# Patient Record
(demographics unavailable — no encounter records)

---

## 2024-11-26 NOTE — HISTORY OF PRESENT ILLNESS
[FreeTextEntry1] : History of present illness The 65-year-old male patient with T2DM, HTN, HLD, and preserved EF on echo in March 2024, presented for a follow-up visit. The patient was last seen three months ago when amlodipine was added to his medication regimen. He reports that his blood pressure has been much better since then, with readings of 127/82 in the morning and 133/87 after lunch. He denies any new symptoms such as chest pain or difficulty breathing. He remains active, walking three to four times a week for a couple of miles each time. His recent EKG showed a normal rhythm, and his cholesterol numbers have improved, likely due to compliance with the prescribed medication.  Past medical history - Type 2 Diabetes Mellitus - Hypertension - Hyperlipidemia  Review of Symptoms  Constitutional, Visual, Respiratory, Cardiovascular, Gastrointestinal, Genitourinary, Neurologic, Integumentary, Endocrine, Musculoskeletal, Psychiatric, and Hematologic systems reviewed and are all negative except as in HPI.  Family history No family history of premature CAD  Social history The patient is active, walking three to four times a week. No smoking or alcohol use.  Current medications - Amlodipine - Atorvastatin 40mg once a day at bedtime - Telmisartan 40mg once a day - Metformin  Vitals Blood pressure: 127/82 in the morning, 133/87 after lunch  Physical exam Heart sounds are clear on auscultation. Unless superseded by above, rest of exam was normal including General: No acute distress HEENT: EOMI Neck: Supple, No JVD, no bruits Lungs: Clear to auscultation bilaterally; No rales or wheezing Heart: Regular rate and rhythm; No murmurs, rubs, or gallops Abdomen: Nontender, bowel sounds present Extremities: No clubbing, cyanosis, or edema Nervous system: Alert & Oriented X3, no focal deficits Psychiatric: Normal affect Skin: No rashes or lesions.  Lab results Improved cholesterol numbers  Assessment The 65-year-old male patient with a history of T2DM, HTN, HLD, and preserved EF on echo in March 2024, presents for a follow-up visit. The patient's clinical status appears to be improving, with improved blood pressure regulation and an improved lipid profile. He is tolerating his medications well and reports no side effects. His physical activity level is adequate, and he has no new symptoms. His Electrocardiogram (EKG) demonstrates normal sinus rhythm, and his heart sounds are clear on auscultation.  Plan 1. Hypertension: Continue telmisartan 40mg once a day and amlodipine 5mg once a day. Well controlled 2. Hyperlipidemia: Continue atorvastatin 40mg once a day at bedtime. Well controlled 3. Diabetes Mellitus: Continue current management with metformin. 4. Lifestyle: Engage in regular physical activity and adhere to a balanced nutritional regimen. 5. Follow-up: Schedule a follow-up appointment in six months, or earlier if any symptoms or concerns develop.  Prescription - Continue atorvastatin 40mg once a day at bedtime - Continue telmisartan 40mg once a day - Continue amlodipine 5mg once a day  Appointments Next follow-up appointment in six months (May 2025)  This note was generated using [employer] software. A reasonable effort has been made for proofreading its contents, but typos may still remain. If there are any questions or points of clarification needed, please notify the office.

## 2024-12-17 NOTE — PLAN
[FreeTextEntry1] : pt has seen diabetic educator and has not seen endocrinologist I refreshed referral.

## 2024-12-17 NOTE — HEALTH RISK ASSESSMENT
[Very Good] : ~his/her~  mood as very good [Yes] : Yes [Monthly or less (1 pt)] : Monthly or less (1 point) [1 or 2 (0 pts)] : 1 or 2 (0 points) [Never (0 pts)] : Never (0 points) [No] : In the past 12 months have you used drugs other than those required for medical reasons? No [No falls in past year] : Patient reported no falls in the past year [Little interest or pleasure doing things] : 1) Little interest or pleasure doing things [Feeling down, depressed, or hopeless] : 2) Feeling down, depressed, or hopeless [0] : 2) Feeling down, depressed, or hopeless: Not at all (0) [PHQ-2 Negative - No further assessment needed] : PHQ-2 Negative - No further assessment needed [Never] : Never [NO] : No [Patient reported colonoscopy was abnormal] : Patient reported colonoscopy was abnormal [HIV Test offered] : HIV Test offered [Hepatitis C test offered] : Hepatitis C test offered [None] : None [With Family] : lives with family [# of Members in Household ___] :  household currently consist of [unfilled] member(s) [Retired] : retired [Less Than High School] : less than high school [] :  [# Of Children ___] : has [unfilled] children [Sexually Active] : sexually active [Feels Safe at Home] : Feels safe at home [Fully functional (bathing, dressing, toileting, transferring, walking, feeding)] : Fully functional (bathing, dressing, toileting, transferring, walking, feeding) [Fully functional (using the telephone, shopping, preparing meals, housekeeping, doing laundry, using] : Fully functional and needs no help or supervision to perform IADLs (using the telephone, shopping, preparing meals, housekeeping, doing laundry, using transportation, managing medications and managing finances) [Smoke Detector] : smoke detector [Safety elements used in home] : safety elements used in home [Seat Belt] :  uses seat belt [Name: ___] : Health Care Proxy's Name: [unfilled]  [Relationship: ___] : Relationship: [unfilled] [Aggressive treatment] : aggressive treatment [FreeTextEntry1] : none [Audit-CScore] : 1 [de-identified] : exercises  three times a week walking bike riding  [de-identified] : healthy  [[id number]] : 0 [Change in mental status noted] : No change in mental status noted [Language] : denies difficulty with language [Behavior] : denies difficulty with behavior [Learning/Retaining New Information] : denies difficulty learning/retaining new information [Handling Complex Tasks] : denies difficulty handling complex tasks [Reasoning] : denies difficulty with reasoning [Spatial Ability and Orientation] : denies difficulty with spatial ability and orientation [Reports changes in hearing] : Reports no changes in hearing [Reports changes in vision] : Reports no changes in vision [Reports changes in dental health] : Reports no changes in dental health [Sunscreen] : does not use sunscreen [Travel to Developing Areas] : does not  travel to developing areas [TB Exposure] : is not being exposed to tuberculosis [Caregiver Concerns] : does not have caregiver concerns [ColonoscopyDate] : 3/18/24 [ColonoscopyComments] : polyps [FreeTextEntry3] :  [de-identified] : last eye exam  2024  [de-identified] : last dental 11/24 [AdvancecareDate] : 12/17/24

## 2024-12-17 NOTE — COUNSELING
[Fall prevention counseling provided] : Fall prevention counseling provided [Adequate lighting] : Adequate lighting [Use proper foot wear] : Use proper foot wear [Sleep ___ hours/day] : Sleep [unfilled] hours/day [Engage in a relaxing activity] : Engage in a relaxing activity [Plan in advance] : Plan in advance [Potential consequences of obesity discussed] : Potential consequences of obesity discussed [Benefits of weight loss discussed] : Benefits of weight loss discussed [Structured Weight Management Program suggested:] : Structured weight management program suggested [Encouraged to maintain food diary] : Encouraged to maintain food diary [Encouraged to increase physical activity] : Encouraged to increase physical activity [Encouraged to use exercise tracking device] : Encouraged to use exercise tracking device [Target Wt Loss Goal ___] : Weight Loss Goals: Target weight loss goal [unfilled] lbs [Weigh Self Weekly] : weigh self weekly [Decrease Portions] : decrease portions [____ min/wk Activity] : [unfilled] min/wk activity [Keep Food Diary] : keep food diary [None] : None [FreeTextEntry1] : low fat low salt  [FreeTextEntry2] : bmi 26 weight 179

## 2024-12-17 NOTE — ASSESSMENT
[FreeTextEntry1] : health He doesnt want vaccines and understands risks   he is up to date with colonoscopy ,eye dental  2 bmi 26  Weight loss, exercise, and diet control were discussed and are highly encouraged. Treatment options were given such as, aqua therapy, and contacting a nutritionist. Recommended to use the elliptical, stationary bike, less use of treadmill. Mindful eating was explained to the patient Obesity is associated with worsening asthma, shortness of breath, and potential for cardiac disease, diabetes, and other underlying medical conditions. 3 diabetes  ---The following has been discussed:--- -Targets for weight and HgA1c have been discussed with patient  -FS goals have been reviewed with the patient in detail: AM <130 post meal<160-180 -Diet and weight goals have been discussed with the patient in detail. -The importance of exercise in the treatment of diabetes has been discussed  with the patient in detail. -Extensive dietary advice provided to patient and the need to avoid concentrated  sweets/simple carbohydrates and to ensure to consume protein with each meal.  -Patient instructed to limit carbohydrates to 60 gms per meal and 15 gms per  snacks.  -Patient to keep a blood sugar log to check fasting and before meals -Patient instructed on importance of daily feet inspection and to reports any  open lesions to physician promptly  DIABETES EDUCATION: Extensive education about diabetes, hyperglycemia, hypoglycemia, glucose target ranges, dietary adherence to ADA diet, avoiding rice and sweets, eating fruits and fresh vegetables highly recommended. Advise to increase the physical activity. Reviewed the importance of following up with outpatient endocrinology/ opthalmology and podiatry appointments discussed. Explained the definition of HBA1C and target range. Explained the complications of diabetes including stroke, renal failure and blindness Reviewed hypoglycemic sign/symptoms and necessary precautions. Advised to buy a glucose tab available OTC and check the Blood sugars when feeling lightheadedness, take half cup of juice or a glucose tab and recheck in 15 min to see if BS improves. Patient verbalized understanding and agrees with the plan 4 hypertension pt sondra in chart and is well controlled    continue medicaitons  low salt diet.   DIETARY SALT (SODIUM); DASH DIET AND BLOOD PRESSURE: To decrease the sodium in your diet:   Use fresh vegetables and foods as much as possible.  Avoid canned and processed foods. Cured meats such as fisher, ham, and sausages are high in salt.  Try using different herbs and spices in your cooking instead of salt.  In restaurants, avoid foods with sauces, cheese, and cured meats. Ask for low-sodium choices. To get more potassium in your diet, eat:  Bananas, fresh or dried apricots, peaches, citrus fruits, melons  Cauliflower, broccoli, tomatoes, carrots, raw spinach, beet greens, potatoes To get more magnesium in your diet, eat:  Whole grain foods, leafy green vegetables, dried fruits  Fish and seafood, poultry  To get more calcium in your diet, eat:  Nonfat milk, yogurt, and low-fat cheeses   Kress and sardines  Cooked dried beans  Broccoli, kale, and bok belem  Tofu or soybean curd DASH stands for "dietary approaches to stop hypertension." The DASH diet is low in total and saturated fat. It is rich in fruits, vegetables, and low-fat dairy foods. The diet allows you to get natural fiber, calcium, and magnesium from food. It prevents or lowers high blood pressure. It can also help lower cholesterol in your blood.  Don't change how you eat all at once. It's much more likely that you'll succeed if you make only one or two small changes at a time. Wait until those changes are a habit, then make a couple more changes. Some good starting steps include:  Add one serving of vegetables to your meals at lunch and dinner. This is an easy way to help you get more vegetables in your diet.  Have a piece of fruit as an afternoon or after-dinner snack. One glass of juice at breakfast is not enough fruit in your diet.  Use half your usual amount of butter, margarine, or salad dressing.  Buy nonfat salad dressing or nonfat sour cream. Follow this guide to select your menu of meals. The number of calories we want you to eat each day will tell you how many servings you can choose from each food group. Calories: 1600 2100 2600 3100 Servings Grains 6 7  10  12  Vegetables 	 4 4  5 6 Fruit 4 5 5 6 Dairy (low-fat) 2  3 3 3  Meat, poultry, fish  1  2 2  Nuts, seeds    1 Fats and sweets 1  2  3 4 Grains and grain products like breads and cereals provide energy, fiber and vitamins. Whole grains have more of these nutrients. One serving equals one of the following: Bagel, 1/2 medium; Barley, cooked 1/2 cup; Biscuit, country style 1 medium; Bread, whole wheat, white 1 slice; Cereals, cold or cooked, 1/2 cup; Cornbread, 1 medium piece; Crackers, sami, 2; Crackers, saltine, 4; Dinner roll, 1medium; English muffin, ; Hamburger bun, ; Muffin, 1 medium; Pancake, 1 medium; Pasta, 1/2 cup cooked; [place], 1/2 large or 1 small; Popcorn, 1 cup popped; Pretzels, 1 ounce; Rice, white, brown, or wild, 1/2 cup cooked; Tortillas, corn or flour, 1 medium; Waffle, 1 medium; Wheat germ, 1/4 cup;  Vegetables are rich sources of potassium, magnesium, and fiber. One serving is 1/2 cup of any of the following cooked vegetables: Asparagus, Beans (green, yellow), Beets, Broccoli, [place] Sprouts, Carrots, Cauliflower, Bonnie, chicory, mustard and turnip (and other) greens, Corn, Kale, Lima beans, Mixed vegetables, Okra, Parsnips, Peas, green, Potatoes (1/2 medium or 1/2 cup mashed), Pumpkin, Rutabaga, Spaghetti or tomato sauce, Spinach, Squash (zucchini or yellow), Stewed tomatoes, Succotash, Sweet potatoes, Turnips, Yam  Raw vegetables: Carrots,1/2 cup; Celery, 1/2 cup; Lettuce (shilo, loose-leaf, green-leaf), 1 cup; Peppers, 1/2 cup; Spinach, 1 cup; Tomato, 1/2 Fruits and fruit juices are important sources of potassium and magnesium. Fruits also contain fiber and are low in sodium and fat. One serving equals: Any fruit juice, # cup (6 ounces); Canned or frozen fruit,  cup (includes applesauce); Dried fruit,  cup;  Fresh fruit: Apple, 1 medium; Apricots, 2 medium; Banana, 1 medium; Berries, 1/2 cup; Melon, 1 wedge, or 1/2 cup; Cherries, 10; Grapefruit, 1/2; Grapes, 15; Kiwi, 1 medium; Ryan, 1/2 small; Nectarine, 1 medium; Orange, 1 medium; Peach, 1 medium; Pear, 1 medium; Pineapple, 1/2 cup; Plums, 2 medium; Tangerine, 1 large Dairy foods provide protein and calcium. Use low-fat or nonfat dairy products to cut down on fat. One serving equals: Skim milk, 1 cup (8 ounces); 1% low fat milk, 1 cup (8 ounces); 2% low fat milk, 1 cup (8 ounces) nonfat dry milk powder (1/3 cup); Low-fat cottage cheese, 1 cup (8 ounces); Parmesan cheese, 1 tablespoon; Mozzarella cheese, part skim, 1/4 cup (1 ounce); Low-fat cheddar cheese, 11/2 ounces; Ricotta cheese, part skim milk or nonfat, 1/4 cup (11/2 ounces); Other low fat or nonfat cheeses (11/2 oz.); Low-fat or nonfat yogurt, fruit-flavored or plain, 1 cup (8 ounces) Low-fat or nonfat frozen yogurt, 1/2 cup (4 ounces); Note: People who can't digest dairy products can try taking lactase enzyme pills or drops (available at drug and grocery stores) when they eat dairy. There is also milk available with the enzyme already added. Or you can buy lactose-free milk. Meat, poultry, and fish are good sources of protein and magnesium. One serving equals: Lean meat including beef, veal, or pork, 3 ounces cooked; Skinless, white meat poultry including turkey, chicken, 3 ounces; Fish and shellfish, 3 ounces cooked; Low-fat luncheon meats, 1 ounce; Egg, 1 medium; Tofu, 3 ounces Note: Three ounces of cooked meat is about the size of a deck of cards. Nuts, seeds, and legumes are rich sources of energy, magnesium, potassium, protein and fiber. Nuts and seeds are also high in fat, so portions should be small. Almonds, 1/3 cup; Beans such as kidney, erazo, and navy, 1/2 cup cooked; Chickpeas and lentils, 1/2 cup cooked; Cashews, 1/3 cup; Filberts, 1/3 cup; Mixed nuts, 1/3 cup; Peanut butter, 2 tablespoons; Peanuts, 1/3 cup; Sesame seeds, 2 tablespoons; Sunflower seeds, 2 tablespoons; Tofu, regular, 3 ounces; Walnuts, 1/3 cup  Following the above diet will give you about 27% fat in your diet. The goal is to have 30% or less of the calories you eat each day be from fat. To meet that goal, do not eat more than 2-3 servings daily of added fat. Also try to limit sweets. One serving equals: Butter or margarine, 1 teaspoon; Mayonnaise, 1 teaspoon; Low-fat mayonnaise, 1 tablespoon; Salad dressing, 1 tablespoon; Low-fat salad dressing, 2 tablespoons; Oil, 1 teaspoon (use olive, canola, safflower, or other vegetable oils); Candy, hard, 3 pieces; Jelly or jam, 1 tablespoon); Jell-O, 1/2 cup; Jelly beans, 1/2 ounce; Maple syrup, 1 tablespoon; Popsicle, 1; Sherbet or nonfat or low-fat frozen yogurt, 1/2 cup; Sugar, 1 tablespoon; Sugared lemonade or fruit punch, 1 cup (8 ounces); Note: Try diet fruit-flavored gelatin or frozen, canned, or fresh fruit for dessert.  Small amounts of alcohol may have benefits to the heart and blood pressure. However, excess use of alcohol can cause damage to the brain, liver and other organs. It can lead to high blood pressure. Drinking more than two drinks (15 ml) every day can raise your blood pressure. 15 ml of alcohol equals:   one 12-ounce bottle of beer   a half glass (5 ounces) of wine   1 ounce (one shot) of 100 proof hard liquor  5 hld  to lower  LDL and non HDL  cholesterol levels     1 limit your intake of foods full of saturated fats  , trans fats, and dietary cholesterol .  Food with a lot of saturated fate include butter, fatty flesh like red meat, full fat and low fat dairy  products  , palm oil and coconut oil .   If you see partially hydrogenated fat in the ingredient  list of food label that food has trans fats.  Top sources of dietary chol include egg yolks , organ meats and shell fish.  one Type of fat omega 3 Fatty acids has been shown to protect against heart disease  . Good sources are cold water fish like salmon, mackerel , halibut ., trout  herring and sardines.     Limit  your intake of meat , poultry and fish to no more than 3.5  ounces per day     Eat a lot more fiber rich foods  like beans , oats, barley fruits and vegetables   .  Food naturally rich in soluble fiber  are good at lowering cholesterol .    Excellent choices  are  oats , oat bran , barley , peas , yams sweet potatoes and legumes  or beans .  Good fruit sources are berries passion fruit, oranges pears,, apricots , apples  and nectarines  .    choose protein rich plant foods   such as legumes or beans nuts and seeds over meat.     Lose as much weight as possible and exercise   Take plant sterol supplements   .A Daily intake  of 1-2 gms  of plant sterols  lowers ldl .    Best choice is supplements  such as Cholestoff  by natures made   because they dont have calories  sugar trans fats   an salt  of many foods enriched with plant sterols.    Take  Metamucil or psyllium   .   Studies have shown 9-10 gms as daily of psyllium   the equivalent of  about  3 teaspoons  of sugar free Metamucil   reduced LDL levels  . 6 elevated psa  pt is being followed by urologist [place]

## 2024-12-17 NOTE — PHYSICAL EXAM
[No Acute Distress] : no acute distress [Well-Appearing] : well-appearing [Conjunctiva] : the conjunctiva were normal in both eyes [PERRL] : pupils were equal in size, round, and reactive to light [EOM Intact] : extraocular movements were intact [Thyroid Normal, No Nodules] : the thyroid was normal and there were no nodules present [Normal Appearance] : was normal in appearance [Neck Supple] : was supple [Rate ___] : at [unfilled] breaths per minute [Normal Rhythm/Effort] : normal respiratory rhythm and effort [Clear Bilaterally] : the lungs were clear to auscultation bilaterally [Normal to Percussion] : the lungs were normal to percussion [Heart Rate ___] : [unfilled] bpm [Rhythm Regular] : regular [Normal Rate] : normal [Normal S1] : normal S1 [Normal S2] : normal S2 [No Murmur] : no murmurs heard [No Pitting Edema] : no pitting edema present [2+] : left 2+ [No Abnormalities] : the abdominal aorta was not enlarged and no bruit was heard [Examination Of The Breasts] : a normal appearance [No Discharge] : no discharge [Soft, Nontender] : the abdomen was soft and nontender [No Mass] : no masses were palpated [No HSM] : no hepatosplenomegaly noted [Urethral Meatus] : meatus normal [Penis Abnormality] : normal uncircumcised penis [Urinary Bladder Findings] : the bladder was normal on palpation [Scrotum] : the scrotum was normal [Testes Tenderness] : no tenderness of the testes [Testes Mass (___cm)] : there were no testicular masses [No Lymphangitis] : no lymphangitis observed [Normal Kyphosis] : normal kyphosis [No Visual Abnormalities] : no visible abnormalities [Normal Lordosis] : normal lordosis [No Scoliosis] : no scoliosis [No Tenderness to Palpation] : no spine tenderness on palpation [No Masses] : no masses [Full ROM] : full ROM [No Pain with ROM] : no pain with motion in any direction [Intact] : all reflexes within normal limits bilaterally [Normal Station and Gait] : the gait and station were normal [Normal Motor Tone] : the muscle tone was normal [Involuntary Movements] : no involuntary movements were seen [Normal Scalp] : inspection of the scalp showed no abnormalities [Examination Of The Hair] : texture and distribution of hair was normal [Complexion Dark] : dark complexion [Multiple Tattoos] : multiple tattoos observed [Normal] : the deep tendon reflexes were normal [Normal Mental Status] : the patient's orientation, memory, attention, language and fund of knowledge were normal [Appropriate] : appropriate [Comprehensive Foot Exam Normal] : Right and left foot were examined and both feet are normal. No ulcers in either foot. Toes are normal and with full ROM.  Normal tactile sensation with monofilament testing throughout both feet [Enlarged Diffusely] : was not enlarged [S3] : no S3 [S4] : no S4 [Rt] : no varicose veins of the right leg [Lt] : no varicose veins of the left leg [Right Carotid Bruit] : no bruit heard over the right carotid [Left Carotid Bruit] : no bruit heard over the left carotid [Right Femoral Bruit] : no bruit heard over the right femoral artery [Left Femoral Bruit] : no bruit heard over the left femoral artery [Bruit] : no bruit heard [Postauricular Lymph Nodes Enlarged Bilaterally] : nodes not enlarged [Preauricular Lymph Nodes Enlarged Bilaterally] : nodes not enlarged [Submandibular Lymph Nodes Enlarged Bilaterally] : nodes not enlarged [Suboccipital Lymph Nodes Enlarged Bilaterally] : nodes not enlarged [Submental Lymph Nodes Enlarged] : nodes not enlarged [Cervical Lymph Nodes Enlarged Posterior Bilaterally] : nodes not enlarged [Cervical Lymph Nodes Enlarged Anterior Bilaterally] : nodes not enlarged [Supraclavicular Lymph Nodes Enlarged Bilaterally] : nodes not enlarged [Axillary Lymph Nodes Enlarged Bilaterally] : nodes not enlarged [Epitrochlear Lymph Nodes Enlarged Bilaterally] : nodes not enlarged [Femoral Lymph Nodes Enlarged Bilaterally] : nodes not enlarged [Inguinal Lymph Nodes Enlarged Bilaterally] : nodes not enlarged [Abnormal Color] : normal color and pigmentation [Skin Lesions 1] : no skin lesions were observed [Skin Turgor Decreased] : normal skin turgor [Impaired judgment] : intact judgment [Impaired Insight] : intact insight [de-identified] : tongue normal partial upper and lower  [de-identified] : scar on lef tknee from replacement

## 2024-12-17 NOTE — HISTORY OF PRESENT ILLNESS
[FreeTextEntry1] : cpe  [de-identified] : Pt is a 66 yr old man with  HTN, DM, HLD, colon polyps He -denies any headaches, nausea, vomiting, fever, chills, sweats, chest pain, chest pressure, diarrhea, constipation, dysphagia, sour taste in the mouth, dizziness, leg swelling, leg pain, myalgias, arthralgias, itchy eyes, itchy ears, heartburn, or reflux.

## 2025-01-27 NOTE — ASSESSMENT
[FreeTextEntry1] : Pt is a 66 year old male with PSA elevation. bph and ed  doing well bph  voiding well check ua cx   ed cont sildnefail   psa  repeat psa bmp     Patient is being seen today for evaluation and management of a chronic and longitudinal ongoing condition and I am of the primary treating physician.  BMP and PSA drawn today

## 2025-01-27 NOTE — HISTORY OF PRESENT ILLNESS
[FreeTextEntry1] : 04/18/2024 s/p prostate biopsy doing well no voiding complaints no fever path benign  07/24/2024 cc: 3 month f/u visit  65 year old male presents with f/u visit. He reports he's currently using Sildenafil on empty stomach and is experiencing no side effects.  01/24/2025 cc f/u visit Pt is a 66 year old male presenting for f/u visit. Pt reports feeling fine and reports no new urological symptoms. Pt reports sildenafil is working and uses as needed. Pt reports his urination is fine.

## 2025-01-27 NOTE — END OF VISIT
[FreeTextEntry4] : This note was written by Maryanne Branch on 01/24/2025 actively solely Elbert Mejía M.D. I, Maryanne Branch, am scribing for and in the presence of Elbert Mejía M.D. in the following sections HISTORY OF PRESENT ILLNESS, PAST MEDICAL/FAMILY/SOCIAL HISTORY; REVIEW OF SYSTEMS; VITAL SIGNS; PHYSICAL EXAM; ASSESSMENT/PLAN. All medical record entries made by this scribe at , Elbert Mejía M.D. direction and personally dictated by me on 01/24/2025. I personally performed the services described in the documentation, reviewed the documentation recorded by the scribe in my presence, and it accurately and completely records my words and actions.

## 2025-05-05 NOTE — PLAN
[FreeTextEntry1] : recommendations.   he needs to have prevnar  20 vaccine  and to bring in his cahrting of blood sugars and bp readings.

## 2025-05-05 NOTE — HEALTH RISK ASSESSMENT
[Yes] : Yes [Monthly or less (1 pt)] : Monthly or less (1 point) [1 or 2 (0 pts)] : 1 or 2 (0 points) [Never (0 pts)] : Never (0 points) [No] : In the past 12 months have you used drugs other than those required for medical reasons? No [No falls in past year] : Patient reported no falls in the past year [Little interest or pleasure doing things] : 1) Little interest or pleasure doing things [Feeling down, depressed, or hopeless] : 2) Feeling down, depressed, or hopeless [0] : 2) Feeling down, depressed, or hopeless: Not at all (0) [PHQ-2 Negative - No further assessment needed] : PHQ-2 Negative - No further assessment needed [Never] : Never [Audit-CScore] : 1 [de-identified] : walking and bike riding  [de-identified] : reg diet  [[id number]] : 0 [AdvancecareDate] : 5/5/25

## 2025-05-05 NOTE — HISTORY OF PRESENT ILLNESS
[FreeTextEntry1] : fu  [de-identified] : Pt is a 66 yr old man with  HTN, DM, HLD, colon polyps He -denies any headaches, nausea, vomiting, fever, chills, sweats, chest pain, chest pressure, diarrhea, constipation, dysphagia, sour taste in the mouth, dizziness, leg swelling, leg pain, myalgias, arthralgias, itchy eyes, itchy ears, heartburn, or reflux. he is unsure of the name of his glucose monitor.  he is taking his medications  and states his glucose levels varies  and didnt bring in his chart.   he states he is checking his bp and charted and is  131/81 and always checks in am and always lower . He has seen ophthalmologist and has new glasses and dentist.  he has not seen a podiatrist.   he is following diabetic diet.

## 2025-05-05 NOTE — COUNSELING
[Fall prevention counseling provided] : Fall prevention counseling provided [Adequate lighting] : Adequate lighting [No throw rugs] : No throw rugs [Use proper foot wear] : Use proper foot wear [Sleep ___ hours/day] : Sleep [unfilled] hours/day [Engage in a relaxing activity] : Engage in a relaxing activity [Plan in advance] : Plan in advance [Potential consequences of obesity discussed] : Potential consequences of obesity discussed [Benefits of weight loss discussed] : Benefits of weight loss discussed [Structured Weight Management Program suggested:] : Structured weight management program suggested [Encouraged to maintain food diary] : Encouraged to maintain food diary [Encouraged to increase physical activity] : Encouraged to increase physical activity [Encouraged to use exercise tracking device] : Encouraged to use exercise tracking device [Target Wt Loss Goal ___] : Weight Loss Goals: Target weight loss goal [unfilled] lbs [Weigh Self Weekly] : weigh self weekly [Decrease Portions] : decrease portions [____ min/wk Activity] : [unfilled] min/wk activity [Keep Food Diary] : keep food diary [FreeTextEntry1] : diabetic  [FreeTextEntry2] : bmi 27  weight 183 [None] : None [Good understanding] : Patient has a good understanding of lifestyle changes and steps needed to achieve self management goal

## 2025-05-05 NOTE — ASSESSMENT
[FreeTextEntry1] : 1 dm  He is up to date with eye exam and dnetal  not podiatry  ---The following has been discussed:--- -Targets for weight and HgA1c have been discussed with patient  -FS goals have been reviewed with the patient in detail: AM <130 post meal<160-180 -Diet and weight goals have been discussed with the patient in detail. -The importance of exercise in the treatment of diabetes has been discussed  with the patient in detail. -Extensive dietary advice provided to patient and the need to avoid concentrated  sweets/simple carbohydrates and to ensure to consume protein with each meal.  -Patient instructed to limit carbohydrates to 60 gms per meal and 15 gms per  snacks.  -Patient to keep a blood sugar log to check fasting and before meals -Patient instructed on importance of daily feet inspection and to reports any  open lesions to physician promptly A diet that includes carbohydrates from fruits, vegetables, whole grains, legumes, and low-fat milk is encouraged. People with diabetes are advised to avoid sugar-sweetened beverages (including fruit juice).   The ideal amount of carbohydrate intake is uncertain. However, it's important for people with diabetes to monitor carbohydrate intake in order to manage their blood sugar levels and adjust insulin dosing as needed. (See 'Carbohydrate counting' above.)   ?In general, a variety of eating patterns (low fat, low carbohydrate, Mediterranean, vegetarian) are acceptable. Eating a healthy diet that contains a lot of the foods you like will make it easier to stick to your plan. However, you should talk to your health care provider before starting any diet that involves extreme restriction (such as a very low carb or "keto" diet). Depending on your situation, some diets may not be recommended.   ?The type of fat consumed appears to be more important than the amount of total fat. Saturated fats (eg, in meats, cheese, ice cream) can be replaced with monounsaturated and polyunsaturated fatty acids (eg, in fish, olive oil, nuts). Trans fatty acid consumption should be kept as low as possible. Trans fats are banned from processed foods in the [place]. Although very small amounts of trans fats are naturally present in meats, poultry and dairy products, the amount is too small for concern.   As diabetes increases your risk of heart disease and stroke, eating a diet low in saturated and trans fats and cholesterol can help to reduce your cholesterol levels and decrease these risks.   ?A dietitian can help you to determine how much protein your diet should include. In general, it's a good idea to get protein from lean meats, fish eggs, beans, soy, and nuts, and to limit the amount of red meat you eat.   ?Eating a diet that is high in fiber may help to keep your blood sugar levels under control.  ?A diet that is low in sodium and high in fruits, vegetables, and low-fat dairy products can help keep blood pressure under control.   ?Artificial sweeteners do not affect blood glucose levels and may be consumed in moderation. If you consume sugar-sweetened beverages regularly, a beverage containing artificial sweeteners (such as diet soda) can be a good short-term replacement strategy. However, the best approach is to avoid both sugar-sweetened and artificially sweetened beverages, and try to drink more water.   ?In the past, people with diabetes were told to avoid all foods with added sugar. This is no longer recommended, although it's important to limit sugar intake. If you take insulin, you should calculate each pre-meal dose based upon the total number of carbohydrates in the food, which includes the sugar content. (See 'Carbohydrate counting' above.)   ?Products that are "sugar-free" or "fat-free" do not necessarily have a reduced number of calories or carbohydrates. Read all nutrition labels carefully and compare with other similar products to determine which has the best balance of serving size and number of calories, carbohydrates, fat, and fiber.   Some sugar-free foods, such as sugar-free gelatin and sugar-free gum, do not have a significant number of calories or carbohydrates and are considered "free foods." Any food that has less than 20 calories and 5 grams of carbohydrate is considered a free food, meaning that it does not affect body weight or require an adjustment to your medication. 2 hld  to lower  LDL and non HDL  cholesterol levels     1 limit your intake of foods full of saturated fats  , trans fats, and dietary cholesterol .  Food with a lot of saturated fate include butter, fatty flesh like red meat, full fat and low fat dairy  products  , palm oil and coconut oil .   If you see partially hydrogenated fat in the ingredient  list of food label that food has trans fats.  Top sources of dietary chol include egg yolks , organ meats and shell fish.  one Type of fat omega 3 Fatty acids has been shown to protect against heart disease  . Good sources are cold water fish like salmon, mackerel , halibut ., trout  herring and sardines.     Limit  your intake of meat , poultry and fish to no more than 3.5  ounces per day     Eat a lot more fiber rich foods  like beans , oats, barley fruits and vegetables   .  Food naturally rich in soluble fiber  are good at lowering cholesterol .    Excellent choices  are  oats , oat bran , barley , peas , yams sweet potatoes and legumes  or beans .  Good fruit sources are berries passion fruit, oranges pears,, apricots , apples  and nectarines  .    choose protein rich plant foods   such as legumes or beans nuts and seeds over meat.     Lose as much weight as possible and exercise   Take plant sterol supplements   .A Daily intake  of 1-2 gms  of plant sterols  lowers ldl .    Best choice is supplements  such as Cholestoff  by natures made   because they dont have calories  sugar trans fats   an salt  of many foods enriched with plant sterols.    Take  Metamucil or psyllium   .   Studies have shown 9-10 gms as daily of psyllium   the equivalent of  about  3 teaspoons  of sugar free Metamucil   reduced LDL levels  . 3. bmi 27   Weight loss, exercise, and diet control were discussed and are highly encouraged. Treatment options were given such as, aqua therapy, and contacting a nutritionist. Recommended to use the elliptical, stationary bike, less use of treadmill. Mindful eating was explained to the patient Obesity is associated with worsening asthma, shortness of breath, and potential for cardiac disease, diabetes, and other underlying medical conditions.   -Nutrition and lifestyle concepts reviewed in detail. Recommending an unprocessed diet with >= 50% of nutrients from whole plant sources; most food early in the day; most calories before 4 pm, and no food after 8 pm; advised starting with small sustainable changes and building up over time. The long-term plan for this type of dietary change was reviewed. A number of resources to help in initiating these changes were provided. -A particular emphasis was placed on the need to remove processed foods from the diet. The concept of insulin resistance was introduced as important for understanding effective weight loss measures. Educational handouts explaining these concepts were provided. 4. psa elevation fu with urologist.   5f hpn   DIETARY SALT (SODIUM); DASH DIET AND BLOOD PRESSURE: To decrease the sodium in your diet:   Use fresh vegetables and foods as much as possible.  Avoid canned and processed foods. Cured meats such as fisher, ham, and sausages are high in salt.  Try using different herbs and spices in your cooking instead of salt.  In restaurants, avoid foods with sauces, cheese, and cured meats. Ask for low-sodium choices. To get more potassium in your diet, eat:  Bananas, fresh or dried apricots, peaches, citrus fruits, melons  Cauliflower, broccoli, tomatoes, carrots, raw spinach, beet greens, potatoes To get more magnesium in your diet, eat:  Whole grain foods, leafy green vegetables, dried fruits  Fish and seafood, poultry  To get more calcium in your diet, eat:  Nonfat milk, yogurt, and low-fat cheeses   [place] and sardines  Cooked dried beans  Broccoli, kale, and bok belem  Tofu or soybean curd DASH stands for "dietary approaches to stop hypertension." The DASH diet is low in total and saturated fat. It is rich in fruits, vegetables, and low-fat dairy foods. The diet allows you to get natural fiber, calcium, and magnesium from food. It prevents or lowers high blood pressure. It can also help lower cholesterol in your blood.  Don't change how you eat all at once. It's much more likely that you'll succeed if you make only one or two small changes at a time. Wait until those changes are a habit, then make a couple more changes. Some good starting steps include:  Add one serving of vegetables to your meals at lunch and dinner. This is an easy way to help you get more vegetables in your diet.  Have a piece of fruit as an afternoon or after-dinner snack. One glass of juice at breakfast is not enough fruit in your diet.  Use half your usual amount of butter, margarine, or salad dressing.  Buy nonfat salad dressing or nonfat sour cream. Follow this guide to select your menu of meals. The number of calories we want you to eat each day will tell you how many servings you can choose from each food group. Calories: 1600 2100 2600 3100 Servings Grains 6 7  10  12  Vegetables 	 4 4  5 6 Fruit 4 5 5 6 Dairy (low-fat) 2  3 3 3  Meat, poultry, fish  1  2 2  Nuts, seeds    1 Fats and sweets 1  2  3 4 Grains and grain products like breads and cereals provide energy, fiber and vitamins. Whole grains have more of these nutrients. One serving equals one of the following: Bagel, 1/2 medium; Barley, cooked 1/2 cup; Biscuit, country style 1 medium; Bread, whole wheat, white 1 slice; Cereals, cold or cooked, 1/2 cup; Cornbread, 1 medium piece; Crackers, sami, 2; Crackers, saltine, 4; Dinner roll, 1medium; English muffin, ; Hamburger bun, ; Muffin, 1 medium; Pancake, 1 medium; Pasta, 1/2 cup cooked; [place], 1/2 large or 1 small; Popcorn, 1 cup popped; Pretzels, 1 ounce; Rice, white, brown, or wild, 1/2 cup cooked; Tortillas, corn or flour, 1 medium; Waffle, 1 medium; Wheat germ, 1/4 cup;  Vegetables are rich sources of potassium, magnesium, and fiber. One serving is 1/2 cup of any of the following cooked vegetables: Asparagus, Beans (green, yellow), Beets, Broccoli, [place] Sprouts, Carrots, Cauliflower, [place], chicory, mustard and turnip (and other) greens, Corn, Kale, Lima beans, Mixed vegetables, Okra, Parsnips, Peas, green, Potatoes (1/2 medium or 1/2 cup mashed), Pumpkin, Rut[place]aga, Spaghetti or tomato sauce, Spinach, Squash (zucchini or yellow), Stewed tomatoes, Succotash, Sweet potatoes, Turnips, Yam  Raw vegetables: Carrots,1/2 cup; Celery, 1/2 cup; Lettuce (shilo, loose-leaf, green-leaf), 1 cup; Peppers, 1/2 cup; Spinach, 1 cup; Tomato, 1/2 Fruits and fruit juices are important sources of potassium and magnesium. Fruits also contain fiber and are low in sodium and fat. One serving equals: Any fruit juice, # cup (6 ounces); Canned or frozen fruit,  cup (includes applesauce); Dried fruit,  cup;  Fresh fruit: Apple, 1 medium; Apricots, 2 medium; Banana, 1 medium; Berries, 1/2 cup; Melon, 1 wedge, or 1/2 cup; Cherries, 10; Grapefruit, 1/2; Grapes, 15; Kiwi, 1 medium; [place], 1/2 small; Nectarine, 1 medium; Orange, 1 medium; Peach, 1 medium; Pear, 1 medium; Pineapple, 1/2 cup; Plums, 2 medium; Tangerine, 1 large Dairy foods provide protein and calcium. Use low-fat or nonfat dairy products to cut down on fat. One serving equals: Skim milk, 1 cup (8 ounces); 1% low fat milk, 1 cup (8 ounces); 2% low fat milk, 1 cup (8 ounces) nonfat dry milk powder (1/3 cup); Low-fat cottage cheese, 1 cup (8 ounces); Parmesan cheese, 1 tablespoon; Mozzarella cheese, part skim, 1/4 cup (1 ounce); Low-fat cheddar cheese, 11/2 ounces; Ricotta cheese, part skim milk or nonfat, 1/4 cup (11/2 ounces); Other low fat or nonfat cheeses (11/2 oz.); Low-fat or nonfat yogurt, fruit-flavored or plain, 1 cup (8 ounces) Low-fat or nonfat frozen yogurt, 1/2 cup (4 ounces); Note: People who can't digest dairy products can try taking lactase enzyme pills or drops (available at drug and grocery stores) when they eat dairy. There is also milk available with the enzyme already added. Or you can buy lactose-free milk. Meat, poultry, and fish are good sources of protein and magnesium. One serving equals: Lean meat including beef, veal, or pork, 3 ounces cooked; Skinless, white meat poultry including turkey, chicken, 3 ounces; Fish and shellfish, 3 ounces cooked; Low-fat luncheon meats, 1 ounce; Egg, 1 medium; Tofu, 3 ounces Note: Three ounces of cooked meat is about the size of a deck of cards. Nuts, seeds, and legumes are rich sources of energy, magnesium, potassium, protein and fiber. Nuts and seeds are also high in fat, so portions should be small. Almonds, 1/3 cup; Beans such as kidney, erazo, and navy, 1/2 cup cooked; Chickpeas and lentils, 1/2 cup cooked; Cashews, 1/3 cup; Filberts, 1/3 cup; Mixed nuts, 1/3 cup; Peanut butter, 2 tablespoons; Peanuts, 1/3 cup; Sesame seeds, 2 tablespoons; Sunflower seeds, 2 tablespoons; Tofu, regular, 3 ounces; Walnuts, 1/3 cup  Following the above diet will give you about 27% fat in your diet. The goal is to have 30% or less of the calories you eat each day be from fat. To meet that goal, do not eat more than 2-3 servings daily of added fat. Also try to limit sweets. One serving equals: Butter or margarine, 1 teaspoon; Mayonnaise, 1 teaspoon; Low-fat mayonnaise, 1 tablespoon; Salad dressing, 1 tablespoon; Low-fat salad dressing, 2 tablespoons; Oil, 1 teaspoon (use olive, canola, safflower, or other vegetable oils); Candy, hard, 3 pieces; Jelly or jam, 1 tablespoon); Jell-O, 1/2 cup; Jelly beans, 1/2 ounce; Maple syrup, 1 tablespoon; Popsicle, 1; Sherbet or nonfat or low-fat frozen yogurt, 1/2 cup; Sugar, 1 tablespoon; Sugared lemonade or fruit punch, 1 cup (8 ounces); Note: Try diet fruit-flavored gelatin or frozen, canned, or fresh fruit for dessert.  Small amounts of alcohol may have benefits to the heart and blood pressure. However, excess use of alcohol can cause damage to the brain, liver and other organs. It can lead to high blood pressure. Drinking more than two drinks (15 ml) every day can raise your blood pressure. 15 ml of alcohol equals:   one 12-ounce bottle of beer   a half glass (5 ounces) of wine   1 ounce (one shot) of 100 proof hard liquor

## 2025-05-28 NOTE — HISTORY OF PRESENT ILLNESS
[FreeTextEntry1] :  Subjective: 66-year-old male patient with T2DM, HTN, HLD, and preserved EF on echo in March 2024, presenting for a follow-up visit.    - Chief Complaint (CC): Follow-up visit for hypertension, hyperlipidemia, and diabetes management   - History of Present Illness: The patient is here for a follow-up visit, last seen approximately six months ago. The patient reports feeling good overall. Home blood pressure readings have been variable, ranging from 117 to 128 systolic. The patient adheres to their blood pressure medication regimen, taking it every morning. They remain active, engaging in regular walking and can climb one or two flights of stairs without issues. The patient denies experiencing chest pain or difficulty breathing. Recently, the patient traveled to [place] about [place], during which time they ran out of their cholesterol medication (atorvastatin). They have since resumed their regular medication regimen upon return. The patient reports feeling better overall but expresses concern about their cholesterol levels being high.   - Past Medical History: The patient has a history of hypertension, hyperlipidemia, and diabetes. No other significant past medical history was discussed during this visit.   - Past Surgical History: No past surgical history was discussed during this visit.   - Family History:     - No family history of premature CAD was mentioned during this visit.   - Social History:     - The patient recently traveled to their home country for about six weeks.   - Other History: No additional relevant history was discussed during this visit.   - Review of Systems:     - General: The patient reports feeling good overall.     - Neurological: No neurological symptoms were reported.     - Musculoskeletal: The patient can climb one or two flights of stairs without issues.     - Cardiovascular: The patient denies experiencing chest pain.     - Respiratory: The patient denies difficulty breathing.     - Gastrointestinal: No gastrointestinal symptoms were reported.     - Genitourinary: No genitourinary symptoms were reported.     - Integumentary: No skin issues were reported.     - Psychiatric: No psychiatric symptoms were reported.   - Medications:     - Amlodipine 5 mg daily     - Telmisartan 40 mg daily     - Atorvastatin 80 mg at bedtime   - Allergies: No allergies were mentioned during this visit.   Objective:   - Diagnostic Results: Recent blood work from May 5, 2025, showed worsening blood sugar control with A1C increasing from 8% to 10%. LDL cholesterol was 137 mg/dL, up from 102 mg/dL in November. The patient has an appointment with a diabetes specialist scheduled for June 19, 2025.   - Vital Signs: Blood pressure: 138/82 mmHg (repeat measurement)   - Physical Examination (PE):     - General: No acute distress     - HEENT: EOMI     - Neck: Supple, No JVD, no bruits.     - Lungs: Clear to auscultation bilaterally; No rales or wheezing     - Heart: Regular rate and rhythm; No murmurs, rubs, or gallops     - Abdomen: Nontender, bowel sounds present     - Extremities: No clubbing, cyanosis, or edema     - Nervous system: Alert & Oriented X3, no focal deficits     - Psychiatric: Normal affect     - Skin: No rashes or lesions   Assessment and Plan:   -  1. Hypertension: Blood pressure is better controlled with current medication regimen of amlodipine 5 mg and telmisartan 40 mg daily. Home readings are variable but generally within an acceptable range. I will continue the current medication regimen and encourage the patient to maintain consistent medication adherence and lifestyle modifications.   -  2. Hyperlipidemia: LDL cholesterol has increased from 102 mg/dL to 137 mg/dL, likely due to a 6-week interruption in atorvastatin therapy during recent travel. Given the patient's diabetes, the target LDL should be <70 mg/dL. I will continue atorvastatin 80 mg at bedtime and reassess in 3 months. If LDL remains >70 mg/dL, I may consider adding an additional lipid-lowering medication (Zetia). I have ordered a lipid panel to be completed before Labor Day, and I will review the results and contact the patient as needed.   -  3. Diabetes: A1C has worsened from 8% to 10%. While I do not directly manage the patient's diabetes, I have emphasized the importance of glycemic control for cardiovascular health. The patient has an appointment scheduled with an endocrinologist next month.    -  4. Cardiovascular risk assessment: Given the patient's multiple risk factors (hypertension, hyperlipidemia, and diabetes), I have counseled them on the importance of medication adherence, regular follow-ups, and lifestyle modifications. The patient reports staying active with regular walking and can climb stairs without difficulty.   -  5. Follow-up plan: I have ordered a lipid panel to be completed before Labor Day. I will review the results and contact the patient if any immediate changes to the treatment plan are necessary. Otherwise, I have scheduled a follow-up appointment in 6 months. If the lipid panel results are concerning, I may request an earlier appointment.   Disclaimer:   - This note was generated using [employer] software. A reasonable effort has been made for proofreading its contents, but typos may still remain. If there are any questions or points of clarification needed, please notify the office

## 2025-05-28 NOTE — HISTORY OF PRESENT ILLNESS
[FreeTextEntry1] :  Subjective: 66-year-old male patient with T2DM, HTN, HLD, and preserved EF on echo in March 2024, presenting for a follow-up visit.    - Chief Complaint (CC): Follow-up visit for hypertension, hyperlipidemia, and diabetes management   - History of Present Illness: The patient is here for a follow-up visit, last seen approximately six months ago. The patient reports feeling good overall. Home blood pressure readings have been variable, ranging from 117 to 128 systolic. The patient adheres to their blood pressure medication regimen, taking it every morning. They remain active, engaging in regular walking and can climb one or two flights of stairs without issues. The patient denies experiencing chest pain or difficulty breathing. Recently, the patient traveled to their [place] for about six weeks, during which time they ran out of their cholesterol medication (atorvastatin). They have since resumed their regular medication regimen upon return. The patient reports feeling better overall but expresses concern about their cholesterol levels being high.   - Past Medical History: The patient has a history of hypertension, hyperlipidemia, and diabetes. No other significant past medical history was discussed during this visit.   - Past Surgical History: No past surgical history was discussed during this visit.   - Family History:     - No family history of premature CAD was mentioned during this visit.   - Social History:     - The patient recently traveled to their home country for about six weeks.   - Other History: No additional relevant history was discussed during this visit.   - Review of Systems:     - General: The patient reports feeling good overall.     - Neurological: No neurological symptoms were reported.     - Musculoskeletal: The patient can climb one or two flights of stairs without issues.     - Cardiovascular: The patient denies experiencing chest pain.     - Respiratory: The patient denies difficulty breathing.     - Gastrointestinal: No gastrointestinal symptoms were reported.     - Genitourinary: No genitourinary symptoms were reported.     - Integumentary: No skin issues were reported.     - Psychiatric: No psychiatric symptoms were reported.   - Medications:     - Amlodipine 5 mg daily     - Telmisartan 40 mg daily     - Atorvastatin 80 mg at bedtime   - Allergies: No allergies were mentioned during this visit.   Objective:   - Diagnostic Results: Recent blood work from May 5, 2025, showed worsening blood sugar control with A1C increasing from 8% to 10%. LDL cholesterol was 137 mg/dL, up from 102 mg/dL in November. The patient has an appointment with a diabetes specialist scheduled for June 19, 2025.   - Vital Signs: Blood pressure: 138/82 mmHg (repeat measurement)   - Physical Examination (PE):     - General: No acute distress     - HEENT: EOMI     - Neck: Supple, No JVD, no bruits.     - Lungs: Clear to auscultation bilaterally; No rales or wheezing     - Heart: Regular rate and rhythm; No murmurs, rubs, or gallops     - Abdomen: Nontender, bowel sounds present     - Extremities: No clubbing, cyanosis, or edema     - Nervous system: Alert & Oriented X3, no focal deficits     - Psychiatric: Normal affect     - Skin: No rashes or lesions   Assessment and Plan:   -  1. Hypertension: Blood pressure is better controlled with current medication regimen of amlodipine 5 mg and telmisartan 40 mg daily. Home readings are variable but generally within an acceptable range. I will continue the current medication regimen and encourage the patient to maintain consistent medication adherence and lifestyle modifications.   -  2. Hyperlipidemia: LDL cholesterol has increased from 102 mg/dL to 137 mg/dL, likely due to a 6-week interruption in atorvastatin therapy during recent travel. Given the patient's diabetes, the target LDL should be <70 mg/dL. I will continue atorvastatin 80 mg at bedtime and reassess in 3 months. If LDL remains >70 mg/dL, I may consider adding an additional lipid-lowering medication (Zetia). I have ordered a lipid panel to be completed before Labor Day, and I will review the results and contact the patient as needed.   -  3. Diabetes: A1C has worsened from 8% to 10%. While I do not directly manage the patient's diabetes, I have emphasized the importance of glycemic control for cardiovascular health. The patient has an appointment scheduled with an endocrinologist next month.    -  4. Cardiovascular risk assessment: Given the patient's multiple risk factors (hypertension, hyperlipidemia, and diabetes), I have counseled them on the importance of medication adherence, regular follow-ups, and lifestyle modifications. The patient reports staying active with regular walking and can climb stairs without difficulty.   -  5. Follow-up plan: I have ordered a lipid panel to be completed before Labor Day. I will review the results and contact the patient if any immediate changes to the treatment plan are necessary. Otherwise, I have scheduled a follow-up appointment in 6 months. If the lipid panel results are concerning, I may request an earlier appointment.   Disclaimer:   - This note was generated using [employer] software. A reasonable effort has been made for proofreading its contents, but typos may still remain. If there are any questions or points of clarification needed, please notify the office

## 2025-06-17 NOTE — HISTORY OF PRESENT ILLNESS
[FreeTextEntry1] : 04/18/2024 s/p prostate biopsy doing well no voiding complaints no fever path benign  07/24/2024 cc: 3 month f/u visit  65 year old male presents with f/u visit. He reports he's currently using Sildenafil on empty stomach and is experiencing no side effects.  01/24/2025 cc f/u visit Pt is a 66 year old male presenting for f/u visit. Pt reports feeling fine and reports no new urological symptoms. Pt reports sildenafil is working and uses as needed. Pt reports his urination is fine. psa 4.58    06/12/2025 cc elevated PSA Pt is a 66 year old male presenting for elevated PSA. Pt reports his erections are okay and he does not use his ED medication very often. Pt reports his urination is fine. Pt reports taking tadalafil 5 mg every so often.

## 2025-06-17 NOTE — END OF VISIT
[FreeTextEntry4] : This note was written by Maryanne Branch on 06/12/2025 actively solely Elbert Mejía M.D. I, Maryanne Branch, am scribing for and in the presence of Elbert Mejía M.D. in the following sections HISTORY OF PRESENT ILLNESS, PAST MEDICAL/FAMILY/SOCIAL HISTORY; REVIEW OF SYSTEMS; VITAL SIGNS; PHYSICAL EXAM; ASSESSMENT/PLAN. All medical record entries made by this scribe at , Elbert Mejía M.D. direction and personally dictated by me on 06/12/2025. I personally performed the services described in the documentation, reviewed the documentation recorded by the scribe in my presence, and it accurately and completely records my words and actions.

## 2025-06-17 NOTE — ASSESSMENT
[FreeTextEntry1] : The natural history of prostate cancer and ongoing controversy regarding screening and potential treatment outcomes of prostate cancer has been discussed with the patient. The meaning of a false positive PSA and a false negative PSA has been discussed. He indicates understanding of the limitations of this screening test REviewed ptions continue  surveillance , biopsy or mri Risks and benefits reviewed  will get mri to eval target fusion biopsy MR Prostate w/wo IV Cont ordered today  ont tadalafil      Patient is being seen today for evaluation and management of a chronic and longitudinal ongoing condition and I am of the primary treating physician.

## 2025-06-19 NOTE — REVIEW OF SYSTEMS
[Fatigue] : fatigue [Recent Weight Loss (___ Lbs)] : recent weight loss: [unfilled] lbs [Blurred Vision] : no blurred vision [Neck Pain] : no neck pain [Chest Pain] : no chest pain [Palpitations] : no palpitations [Lower Ext Edema] : no lower extremity edema [Shortness Of Breath] : no shortness of breath [SOB on Exertion] : no shortness of breath on exertion [Nausea] : no nausea [Constipation] : no constipation [Abdominal Pain] : no abdominal pain [Vomiting] : no vomiting [Diarrhea] : no diarrhea [Polyuria] : no polyuria [Joint Pain] : no joint pain [Headaches] : no headaches [Tremors] : no tremors [Pain/Numbness of Digits] : no pain/numbness of digits

## 2025-06-19 NOTE — HISTORY OF PRESENT ILLNESS
[FreeTextEntry1] :    Diabetes diagnosed for _3-4 years   Medication regimen: Metformin stopped  Recent fingersticks:  140-160 200 in the morning,  Hypoglycemic events:  Denies Diet:   BF: Mushroom coffee with no sugar, eggs, 2 slices of toast, strawberries and blueberries Lunch: Does not eat lunch Dinner:  Chicken fried rice,  Exercise:  Walking 3-4 times Opthalmology appointment: No retinpathy  Peripheral Neuropathy: Denies CVD Infections Vaccinations